# Patient Record
Sex: FEMALE | Race: OTHER | URBAN - METROPOLITAN AREA
[De-identification: names, ages, dates, MRNs, and addresses within clinical notes are randomized per-mention and may not be internally consistent; named-entity substitution may affect disease eponyms.]

---

## 2017-01-13 NOTE — PATIENT DISCUSSION
MACULAR DRUSEN, OU: STABLE. PRESCRIBE AREDS 2 VITAMINS AND INCREASE UV PROTECTION. RETURN FOR FOLLOW-UP AS SCHEDULED.

## 2017-01-13 NOTE — PATIENT DISCUSSION
Retinopathy Counseling :  I have discussed with the patient the importance of controlling blood glucose, blood pressure and lipid levels levels  to minimize the risk of developing retinal disease from diabetes. Explained the importance of annual dilated eye exams because effective treatment for diabetic retinopathy depends on timely intervention. The patient was instructed to call or return sooner if they noticed blurred or distorted vision, fluctuating vision, pain or redness around one or both eyes. Return for follow-up as scheduled.

## 2017-01-13 NOTE — PATIENT DISCUSSION
PCF, OU: BECOMING VISUALLY SIGNIFICANT BUT NOT BOTHERSOME TO PATIENT AT THIS TIME. CONTINUE TO FOLLOW FOR NOW. OFFER SPEC RX UPDATE.

## 2017-01-13 NOTE — PATIENT DISCUSSION
MILD DRY EYES: PRESCRIBED ARTIFICIAL TEARS BID - QID, OU. SAMPLE OF SYSTANE ULTRA PROVIDED. RETURN FOR FOLLOW-UP AS SCHEDULED OR SOONER IF SYMPTOMS WORSEN.

## 2020-03-09 NOTE — PATIENT DISCUSSION
MACULAR DRUSEN, OU:  MONITOR ANNUALLY, RECOMMEND AMSLER GRID DAILY / UV PROTECTION. OCT STABLE. RETURN FOR FOLLOW-UP AS SCHEDULED.

## 2020-03-09 NOTE — PATIENT DISCUSSION
ENTROPION- LATERAL ASPECT CHRISTOPHER:  VISUALLY SIGNIFICANT TO THE PATIENT. RECOMMEND ARTIFICIAL TEARS OR LUBRICATING OINTMENT AS NEEDED.   REFER TO [facility]OP[facility] SPECIALIST, DR. Bhakti Steinberg

## 2020-03-09 NOTE — PATIENT DISCUSSION
EPIPHORA, OS: ASSOCIATED WITH DRY EYE SYNDROME. RECOMMEND ORAL OMEGA-3, LID HYGIENE,  INCREASED FREQUENCY OF PRESERVATIVE FREE ARTIFICIAL TEARS.

## 2022-06-06 ENCOUNTER — NEW PATIENT (OUTPATIENT)
Dept: URBAN - METROPOLITAN AREA CLINIC 20 | Facility: CLINIC | Age: 27
End: 2022-06-06

## 2022-06-06 DIAGNOSIS — H52.13: ICD-10-CM

## 2022-06-06 PROCEDURE — 92004 COMPRE OPH EXAM NEW PT 1/>: CPT

## 2022-06-06 PROCEDURE — 92015 DETERMINE REFRACTIVE STATE: CPT

## 2022-06-06 PROCEDURE — 92310C CONTACT LENS 75

## 2022-06-06 ASSESSMENT — TONOMETRY
OS_IOP_MMHG: 11
OD_IOP_MMHG: 11

## 2022-06-06 ASSESSMENT — KERATOMETRY
OD_K1POWER_DIOPTERS: 45.50
OS_AXISANGLE_DEGREES: 85
OS_AXISANGLE2_DEGREES: 175
OD_AXISANGLE2_DEGREES: 59
OS_K1POWER_DIOPTERS: 45.50
OD_AXISANGLE_DEGREES: 149
OD_K2POWER_DIOPTERS: 45.75
OS_K2POWER_DIOPTERS: 45.75

## 2022-06-06 ASSESSMENT — VISUAL ACUITY
OD_CC: 20/30-2
OS_CC: 20/25-2

## 2022-07-19 ENCOUNTER — DASHBOARD ENCOUNTERS (OUTPATIENT)
Age: 27
End: 2022-07-19

## 2022-07-19 ENCOUNTER — OFFICE VISIT (OUTPATIENT)
Dept: URBAN - METROPOLITAN AREA CLINIC 72 | Facility: CLINIC | Age: 27
End: 2022-07-19
Payer: COMMERCIAL

## 2022-07-19 VITALS
HEIGHT: 65 IN | SYSTOLIC BLOOD PRESSURE: 132 MMHG | TEMPERATURE: 98.7 F | WEIGHT: 136.2 LBS | HEART RATE: 107 BPM | BODY MASS INDEX: 22.69 KG/M2 | DIASTOLIC BLOOD PRESSURE: 81 MMHG

## 2022-07-19 DIAGNOSIS — R14.0 BLOATING: ICD-10-CM

## 2022-07-19 DIAGNOSIS — R19.4 CHANGE IN BOWEL HABITS: ICD-10-CM

## 2022-07-19 PROCEDURE — 99204 OFFICE O/P NEW MOD 45 MIN: CPT | Performed by: INTERNAL MEDICINE

## 2022-07-19 NOTE — HPI-TODAY'S VISIT:
Ms. Krueger is a pleasant 27-year-old who presents as a new patient for consultation for constipation, she was referred by Dr. Ananth Arambula.  This consultation will be forwarded to the referring physician.  CT scan done for right lower quadrant mass with contrast showed subcentimeter nodules that have improved in size likely reactive lymph nodes.  This was done in May 2022.  She reports that since January she has been experiencing abdominal discomfort.  She reports that she can have loose stools and diarrhea but also have 4-5 bowel movements daily that are normal.  She denies any significant changes in her life for the last 6 to 7 months.  She notes that she used to be able to eat and drink whatever she wanted without difficulty.  She now is eating healthy and does not consume alcohol routinely and notes that she has daily bloating and abdominal discomfort.  She denies any blood in her stool.  Denies any nausea or vomiting.  She was told on her CT scan done for follow-up of a lymph node that she had significant constipation despite the symptoms mentioned above and was told to do a bowel purge which substantially helped her bloating for approximately 1 to 2 days but then it returned.  She has had a negative gynecologic work-up.  Her only medication is birth control.  She has not been checked for celiac disease.

## 2022-10-03 ENCOUNTER — TELEPHONE ENCOUNTER (OUTPATIENT)
Dept: URBAN - METROPOLITAN AREA CLINIC 72 | Facility: CLINIC | Age: 27
End: 2022-10-03

## 2022-10-04 ENCOUNTER — OFFICE VISIT (OUTPATIENT)
Dept: URBAN - METROPOLITAN AREA CLINIC 72 | Facility: CLINIC | Age: 27
End: 2022-10-04

## 2023-05-31 ENCOUNTER — PREPPED CHART (OUTPATIENT)
Dept: URBAN - METROPOLITAN AREA CLINIC 16 | Facility: CLINIC | Age: 28
End: 2023-05-31

## 2023-06-28 ENCOUNTER — NEW PATIENT (OUTPATIENT)
Dept: URBAN - METROPOLITAN AREA CLINIC 16 | Facility: CLINIC | Age: 28
End: 2023-06-28

## 2023-06-28 DIAGNOSIS — H52.13: ICD-10-CM

## 2023-06-28 PROCEDURE — 92014 COMPRE OPH EXAM EST PT 1/>: CPT

## 2023-06-28 PROCEDURE — 92015 DETERMINE REFRACTIVE STATE: CPT

## 2023-06-28 PROCEDURE — 92310C CONTACT LENS 75

## 2023-06-28 ASSESSMENT — KERATOMETRY
OD_AXISANGLE_DEGREES: 138
OD_K2POWER_DIOPTERS: 45.75
OS_AXISANGLE_DEGREES: 180
OS_K1POWER_DIOPTERS: 45.75
OS_AXISANGLE2_DEGREES: 90
OS_K2POWER_DIOPTERS: 45.75
OD_K1POWER_DIOPTERS: 45.50
OD_AXISANGLE2_DEGREES: 48

## 2023-06-28 ASSESSMENT — VISUAL ACUITY
OS_CC: 20/20
OU_CC: 20/20
OD_CC: 20/20
OD_CC: 20/20
OU_CC: 20/20
OS_CC: 20/20

## 2023-06-28 ASSESSMENT — TONOMETRY
OD_IOP_MMHG: 14
OS_IOP_MMHG: 13

## 2024-07-01 ENCOUNTER — COMPREHENSIVE EXAM (OUTPATIENT)
Dept: URBAN - METROPOLITAN AREA CLINIC 16 | Facility: CLINIC | Age: 29
End: 2024-07-01

## 2024-07-01 DIAGNOSIS — H52.13: ICD-10-CM

## 2024-07-01 PROCEDURE — 92014 COMPRE OPH EXAM EST PT 1/>: CPT

## 2024-07-01 PROCEDURE — 92310C CONTACT LENS 75

## 2024-07-01 PROCEDURE — 92015 DETERMINE REFRACTIVE STATE: CPT

## 2024-07-01 ASSESSMENT — VISUAL ACUITY
OS_CC: 20/20
OU_SC: 20/20
OD_CC: 20/20
OS_SC: 20/200
OS_SC: 20/20
OU_CC: 20/20
OS_CC: 20/20
OU_SC: 20/100
OD_PH: 20/40
OS_PH: 20/50
OU_CC: 20/20
OD_SC: 20/100
OD_SC: 20/20
OD_CC: 20/20

## 2024-07-01 ASSESSMENT — KERATOMETRY
OS_AXISANGLE2_DEGREES: 123
OD_AXISANGLE_DEGREES: 138
OD_K2POWER_DIOPTERS: 45.75
OD_AXISANGLE2_DEGREES: 64
OD_AXISANGLE2_DEGREES: 48
OD_K1POWER_DIOPTERS: 45.00
OS_K1POWER_DIOPTERS: 45.50
OS_K2POWER_DIOPTERS: 45.75
OS_AXISANGLE_DEGREES: 33
OS_AXISANGLE_DEGREES: 180
OD_K2POWER_DIOPTERS: 45.50
OD_K1POWER_DIOPTERS: 45.50
OD_AXISANGLE_DEGREES: 154
OS_K1POWER_DIOPTERS: 45.75
OS_AXISANGLE2_DEGREES: 90

## 2024-07-01 ASSESSMENT — TONOMETRY
OD_IOP_MMHG: 13
OS_IOP_MMHG: 15

## 2025-07-07 ENCOUNTER — COMPREHENSIVE EXAM (OUTPATIENT)
Age: 30
End: 2025-07-07

## 2025-07-07 DIAGNOSIS — H52.13: ICD-10-CM

## 2025-07-07 PROCEDURE — 92015 DETERMINE REFRACTIVE STATE: CPT

## 2025-07-07 PROCEDURE — 92310-2 LEVEL 2 SOFT LENS UPDATE

## 2025-07-07 PROCEDURE — 92014 COMPRE OPH EXAM EST PT 1/>: CPT
